# Patient Record
Sex: MALE
[De-identification: names, ages, dates, MRNs, and addresses within clinical notes are randomized per-mention and may not be internally consistent; named-entity substitution may affect disease eponyms.]

---

## 2020-02-03 ENCOUNTER — HOSPITAL ENCOUNTER (EMERGENCY)
Dept: HOSPITAL 5 - ED | Age: 45
Discharge: HOME | End: 2020-02-03
Payer: COMMERCIAL

## 2020-02-03 VITALS — DIASTOLIC BLOOD PRESSURE: 79 MMHG | SYSTOLIC BLOOD PRESSURE: 156 MMHG

## 2020-02-03 DIAGNOSIS — R09.89: ICD-10-CM

## 2020-02-03 DIAGNOSIS — I10: Primary | ICD-10-CM

## 2020-02-03 DIAGNOSIS — Z98.890: ICD-10-CM

## 2020-02-03 DIAGNOSIS — Z79.899: ICD-10-CM

## 2020-02-03 DIAGNOSIS — Z87.891: ICD-10-CM

## 2020-02-03 LAB
ALBUMIN SERPL-MCNC: 4.8 G/DL (ref 3.9–5)
ALT SERPL-CCNC: 43 UNITS/L (ref 7–56)
APTT BLD: 28.2 SEC. (ref 24.2–36.6)
BASOPHILS # (AUTO): 0.1 K/MM3 (ref 0–0.1)
BASOPHILS NFR BLD AUTO: 0.8 % (ref 0–1.8)
BILIRUB DIRECT SERPL-MCNC: < 0.2 MG/DL (ref 0–0.2)
BUN SERPL-MCNC: 10 MG/DL (ref 9–20)
BUN/CREAT SERPL: 11 %
CALCIUM SERPL-MCNC: 9.8 MG/DL (ref 8.4–10.2)
EOSINOPHIL # BLD AUTO: 0.1 K/MM3 (ref 0–0.4)
EOSINOPHIL NFR BLD AUTO: 0.7 % (ref 0–4.3)
HCT VFR BLD CALC: 44.6 % (ref 35.5–45.6)
HEMOLYSIS INDEX: 7
HGB BLD-MCNC: 15.5 GM/DL (ref 11.8–15.2)
INR PPP: 0.92 (ref 0.87–1.13)
LYMPHOCYTES # BLD AUTO: 1.7 K/MM3 (ref 1.2–5.4)
LYMPHOCYTES NFR BLD AUTO: 16.1 % (ref 13.4–35)
MCHC RBC AUTO-ENTMCNC: 35 % (ref 32–34)
MCV RBC AUTO: 92 FL (ref 84–94)
MONOCYTES # (AUTO): 0.6 K/MM3 (ref 0–0.8)
MONOCYTES % (AUTO): 5.7 % (ref 0–7.3)
PLATELET # BLD: 179 K/MM3 (ref 140–440)
RBC # BLD AUTO: 4.85 M/MM3 (ref 3.65–5.03)

## 2020-02-03 PROCEDURE — 83880 ASSAY OF NATRIURETIC PEPTIDE: CPT

## 2020-02-03 PROCEDURE — 80076 HEPATIC FUNCTION PANEL: CPT

## 2020-02-03 PROCEDURE — 80048 BASIC METABOLIC PNL TOTAL CA: CPT

## 2020-02-03 PROCEDURE — 85025 COMPLETE CBC W/AUTO DIFF WBC: CPT

## 2020-02-03 PROCEDURE — 93010 ELECTROCARDIOGRAM REPORT: CPT

## 2020-02-03 PROCEDURE — 36415 COLL VENOUS BLD VENIPUNCTURE: CPT

## 2020-02-03 PROCEDURE — 82550 ASSAY OF CK (CPK): CPT

## 2020-02-03 PROCEDURE — 82553 CREATINE MB FRACTION: CPT

## 2020-02-03 PROCEDURE — 85730 THROMBOPLASTIN TIME PARTIAL: CPT

## 2020-02-03 PROCEDURE — 84484 ASSAY OF TROPONIN QUANT: CPT

## 2020-02-03 PROCEDURE — 85610 PROTHROMBIN TIME: CPT

## 2020-02-03 PROCEDURE — 83735 ASSAY OF MAGNESIUM: CPT

## 2020-02-03 PROCEDURE — 93005 ELECTROCARDIOGRAM TRACING: CPT

## 2020-02-03 PROCEDURE — 71046 X-RAY EXAM CHEST 2 VIEWS: CPT

## 2020-02-03 NOTE — XRAY REPORT
CHEST 2 VIEWS 



INDICATION / CLINICAL INFORMATION:

Hypertension.



COMPARISON: 

None available.



FINDINGS:



SUPPORT DEVICES: None.



HEART / MEDIASTINUM: No significant abnormality. 



LUNGS / PLEURA: No significant pulmonary or pleural abnormality. No pneumothorax. Normal heart size a
nd pulmonary vascular.



ADDITIONAL FINDINGS: No significant additional findings.



IMPRESSION:

No significant abnormality.



Signer Name: Abdullahi Rodriguez MD 

Signed: 2/3/2020 4:31 PM

 Workstation Name: [id number]

## 2020-02-03 NOTE — EMERGENCY DEPARTMENT REPORT
ED General Adult HPI





- General


Chief complaint: High BP


Stated complaint: /110


Time Seen by Provider: 02/03/20 16:11


Source: patient


Mode of arrival: Ambulatory


Limitations: No Limitations





- History of Present Illness


Initial comments: 





Patient presents to the emergency department with a chief complaint elevated 

blood pressure.  Patient states on Friday he went to his physician and got a 

prescription for nifedipine but prior to that she had not been on blood pressure

medication for quite some time.  Patient does endorse some mild chest pain and 

on my interview the patient denies a headache although he stated that in triage.

 Patient denies any slurred speech, facial droop, or weakness.


-: unknown


Severity scale (0 -10): 1


Consistency: constant


Improves with: none


Worsens with: none


Associated Symptoms: denies other symptoms


Treatments Prior to Arrival: none





- Related Data


                                  Previous Rx's











 Medication  Instructions  Recorded  Last Taken  Type


 


NIFEdipine [Afeditab Cr] 60 mg PO DAILY #30 tablet.er 02/03/20 Unknown Rx


 


lisinopriL [Zestril TAB] 20 mg PO BID #60 tablet 02/03/20 Unknown Rx











                                    Allergies











Allergy/AdvReac Type Severity Reaction Status Date / Time


 


No Known Allergies Allergy   Unverified 02/03/20 13:56














ED Review of Systems


ROS: 


Stated complaint: /110


Other details as noted in HPI





Constitutional: denies: chills, fever


Eyes: denies: eye pain, eye discharge, vision change


ENT: denies: ear pain, throat pain


Respiratory: denies: cough, shortness of breath, wheezing


Cardiovascular: denies: chest pain, palpitations


Endocrine: no symptoms reported


Gastrointestinal: denies: abdominal pain, nausea, diarrhea


Genitourinary: denies: urgency, dysuria


Musculoskeletal: denies: back pain, joint swelling, arthralgia


Skin: denies: rash, lesions


Neurological: denies: headache, weakness, paresthesias


Psychiatric: denies: anxiety, depression


Hematological/Lymphatic: denies: easy bleeding, easy bruising





ED Past Medical Hx





- Past Medical History


Previous Medical History?: Yes


Hx Hypertension: Yes





- Surgical History


Past Surgical History?: Yes


Additional Surgical History: Left ACL repair





- Social History


Smoking Status: Former Smoker


Substance Use Type: Alcohol





- Medications


Home Medications: 


                                Home Medications











 Medication  Instructions  Recorded  Confirmed  Last Taken  Type


 


NIFEdipine [Afeditab Cr] 60 mg PO DAILY #30 tablet.er 02/03/20  Unknown Rx


 


lisinopriL [Zestril TAB] 20 mg PO BID #60 tablet 02/03/20  Unknown Rx














ED Physical Exam





- General


Limitations: No Limitations


General appearance: alert, in no apparent distress





- Head


Head exam: Present: atraumatic, normocephalic





- Eye


Eye exam: Present: normal appearance, PERRL, EOMI





- ENT


ENT exam: Present: mucous membranes moist





- Neck


Neck exam: Present: normal inspection





- Respiratory


Respiratory exam: Present: normal lung sounds bilaterally.  Absent: respiratory 

distress





- Cardiovascular


Cardiovascular Exam: Present: regular rate, normal rhythm.  Absent: systolic 

murmur, diastolic murmur, rubs, gallop





- GI/Abdominal


GI/Abdominal exam: Present: soft, normal bowel sounds





- Rectal


Rectal exam: Present: deferred





- Extremities Exam


Extremities exam: Present: normal inspection





- Back Exam


Back exam: Present: normal inspection





- Neurological Exam


Neurological exam: Present: alert, oriented X3, CN II-XII intact.  Absent: motor

sensory deficit





- Psychiatric


Psychiatric exam: Present: normal affect, normal mood





- Skin


Skin exam: Present: warm, dry, intact, normal color.  Absent: rash





ED Course


                                   Vital Signs











  02/03/20 02/03/20 02/03/20





  14:03 14:19 15:20


 


Temperature 98.8 F 98 F 


 


Pulse Rate 105 H 106 H 


 


Respiratory 18 18 10 L





Rate   


 


Blood Pressure 211/109 211/109 


 


Blood Pressure   





[Right]   


 


O2 Sat by Pulse 99 99 99





Oximetry   














  02/03/20 02/03/20 02/03/20





  15:23 15:24 15:30


 


Temperature 98.2 F  


 


Pulse Rate 90  84


 


Respiratory 11 L 18 27 H





Rate   


 


Blood Pressure   154/91


 


Blood Pressure 154/91  





[Right]   


 


O2 Sat by Pulse 99 99 97





Oximetry   














  02/03/20 02/03/20 02/03/20





  15:46 15:57 16:09


 


Temperature   


 


Pulse Rate  78 


 


Respiratory   





Rate   


 


Blood Pressure 154/91 169/97 169/97


 


Blood Pressure   





[Right]   


 


O2 Sat by Pulse 99  98





Oximetry   














  02/03/20 02/03/20 02/03/20





  16:16 16:30 16:46


 


Temperature   


 


Pulse Rate 90 85 87


 


Respiratory 16 11 L 12





Rate   


 


Blood Pressure 169/97 169/97 169/97


 


Blood Pressure   





[Right]   


 


O2 Sat by Pulse 99 98 96





Oximetry   














  02/03/20





  17:00


 


Temperature 


 


Pulse Rate 86


 


Respiratory 10 L





Rate 


 


Blood Pressure 156/79


 


Blood Pressure 





[Right] 


 


O2 Sat by Pulse 95





Oximetry 














ED Medical Decision Making





- Lab Data


Result diagrams: 


                                 02/03/20 14:52





                                 02/03/20 14:52








                                   Lab Results











  02/03/20 02/03/20 02/03/20 Range/Units





  14:52 14:52 14:52 


 


WBC  10.5    (4.5-11.0)  K/mm3


 


RBC  4.85    (3.65-5.03)  M/mm3


 


Hgb  15.5 H    (11.8-15.2)  gm/dl


 


Hct  44.6    (35.5-45.6)  %


 


MCV  92    (84-94)  fl


 


MCH  32    (28-32)  pg


 


MCHC  35 H    (32-34)  %


 


RDW  13.1 L    (13.2-15.2)  %


 


Plt Count  179    (140-440)  K/mm3


 


Lymph % (Auto)  16.1    (13.4-35.0)  %


 


Mono % (Auto)  5.7    (0.0-7.3)  %


 


Eos % (Auto)  0.7    (0.0-4.3)  %


 


Baso % (Auto)  0.8    (0.0-1.8)  %


 


Lymph #  1.7    (1.2-5.4)  K/mm3


 


Mono #  0.6    (0.0-0.8)  K/mm3


 


Eos #  0.1    (0.0-0.4)  K/mm3


 


Baso #  0.1    (0.0-0.1)  K/mm3


 


Seg Neutrophils %  76.7 H    (40.0-70.0)  %


 


Seg Neutrophils #  8.0 H    (1.8-7.7)  K/mm3


 


PT   12.4   (12.2-14.9)  Sec.


 


INR   0.92   (0.87-1.13)  


 


APTT   28.2   (24.2-36.6)  Sec.


 


Sodium    139  (137-145)  mmol/L


 


Potassium    4.6  (3.6-5.0)  mmol/L


 


Chloride    97.6 L  ()  mmol/L


 


Carbon Dioxide    25  (22-30)  mmol/L


 


Anion Gap    21  mmol/L


 


BUN    10  (9-20)  mg/dL


 


Creatinine    0.9  (0.8-1.5)  mg/dL


 


Estimated GFR    > 60  ml/min


 


BUN/Creatinine Ratio    11  %


 


Glucose    118 H  ()  mg/dL


 


Calcium    9.8  (8.4-10.2)  mg/dL


 


Magnesium     (1.7-2.3)  mg/dL


 


Total Bilirubin    0.50  (0.1-1.2)  mg/dL


 


Direct Bilirubin    < 0.2  (0-0.2)  mg/dL


 


Indirect Bilirubin    0.3  mg/dL


 


AST    22  (5-40)  units/L


 


ALT    43  (7-56)  units/L


 


Alkaline Phosphatase    55  ()  units/L


 


Total Creatine Kinase    178 H  ()  units/L


 


CK-MB (CK-2)    3.5  (0.0-4.0)  ng/mL


 


CK-MB (CK-2) Rel Index    1.9  (0-4)  


 


Troponin T    < 0.010  (0.00-0.029)  ng/mL


 


NT-Pro-B Natriuret Pep    75.04  (0-450)  pg/mL


 


Total Protein    7.6  (6.3-8.2)  g/dL


 


Albumin    4.8  (3.9-5)  g/dL


 


Albumin/Globulin Ratio    1.7  %














  02/03/20 02/03/20 Range/Units





  14:52 16:37 


 


WBC    (4.5-11.0)  K/mm3


 


RBC    (3.65-5.03)  M/mm3


 


Hgb    (11.8-15.2)  gm/dl


 


Hct    (35.5-45.6)  %


 


MCV    (84-94)  fl


 


MCH    (28-32)  pg


 


MCHC    (32-34)  %


 


RDW    (13.2-15.2)  %


 


Plt Count    (140-440)  K/mm3


 


Lymph % (Auto)    (13.4-35.0)  %


 


Mono % (Auto)    (0.0-7.3)  %


 


Eos % (Auto)    (0.0-4.3)  %


 


Baso % (Auto)    (0.0-1.8)  %


 


Lymph #    (1.2-5.4)  K/mm3


 


Mono #    (0.0-0.8)  K/mm3


 


Eos #    (0.0-0.4)  K/mm3


 


Baso #    (0.0-0.1)  K/mm3


 


Seg Neutrophils %    (40.0-70.0)  %


 


Seg Neutrophils #    (1.8-7.7)  K/mm3


 


PT    (12.2-14.9)  Sec.


 


INR    (0.87-1.13)  


 


APTT    (24.2-36.6)  Sec.


 


Sodium    (137-145)  mmol/L


 


Potassium    (3.6-5.0)  mmol/L


 


Chloride    ()  mmol/L


 


Carbon Dioxide    (22-30)  mmol/L


 


Anion Gap    mmol/L


 


BUN    (9-20)  mg/dL


 


Creatinine    (0.8-1.5)  mg/dL


 


Estimated GFR    ml/min


 


BUN/Creatinine Ratio    %


 


Glucose    ()  mg/dL


 


Calcium    (8.4-10.2)  mg/dL


 


Magnesium  2.10   (1.7-2.3)  mg/dL


 


Total Bilirubin    (0.1-1.2)  mg/dL


 


Direct Bilirubin    (0-0.2)  mg/dL


 


Indirect Bilirubin    mg/dL


 


AST    (5-40)  units/L


 


ALT    (7-56)  units/L


 


Alkaline Phosphatase    ()  units/L


 


Total Creatine Kinase    ()  units/L


 


CK-MB (CK-2)    (0.0-4.0)  ng/mL


 


CK-MB (CK-2) Rel Index    (0-4)  


 


Troponin T   < 0.010  (0.00-0.029)  ng/mL


 


NT-Pro-B Natriuret Pep    (0-450)  pg/mL


 


Total Protein    (6.3-8.2)  g/dL


 


Albumin    (3.9-5)  g/dL


 


Albumin/Globulin Ratio    %











Critical care attestation.: 


If time is entered above; I have spent that time in minutes in the direct care 

of this critically ill patient, excluding procedure time.








ED Disposition


Clinical Impression: 


 Hypertension, Chest pain, non-cardiac





Disposition: DC-01 TO HOME OR SELFCARE


Is pt being admited?: No


Does the pt Need Aspirin: No


Condition: Stable


Instructions:  Hypertension (ED), Chest Pain (ED)


Additional Instructions: 


return if worse


Prescriptions: 


NIFEdipine [Afeditab Cr] 60 mg PO DAILY #30 tablet.er


lisinopriL [Zestril TAB] 20 mg PO BID #60 tablet


[facility]: 


[facility]


[facility] [Provider Group] - 3-5 Days


[facility] [Provider Group] - 3-5 Days


Time of Disposition: 18:49

## 2020-02-03 NOTE — EVENT NOTE
ED Screening Note


Date of service: 02/03/20


ED Screening Note: 








This initial assessment/diagnostic orders/clinical plan/treatment(s) is/are 

subject to change based on patients health status, clinical progression and re-

assessment by fellow clinical providers in the ED. Further treatment and workup 

at subsequent clinical providers discretion. Patient/guardian urged not to elope

from the ED as their condition may be serious if not clinically assessed and 

managed. 





Initial orders include: